# Patient Record
Sex: FEMALE | URBAN - METROPOLITAN AREA
[De-identification: names, ages, dates, MRNs, and addresses within clinical notes are randomized per-mention and may not be internally consistent; named-entity substitution may affect disease eponyms.]

---

## 2023-03-29 ENCOUNTER — OFFICE VISIT (OUTPATIENT)
Facility: LOCATION | Age: 73
End: 2023-03-29
Payer: MEDICARE

## 2023-03-29 DIAGNOSIS — H25.813 COMBINED FORMS OF AGE-RELATED CATARACT, BILATERAL: Primary | ICD-10-CM

## 2023-03-29 DIAGNOSIS — H04.123 DRY EYE SYNDROME OF BILATERAL LACRIMAL GLANDS: ICD-10-CM

## 2023-03-29 DIAGNOSIS — H02.413 MECHANICAL PTOSIS OF BILATERAL EYELIDS: ICD-10-CM

## 2023-03-29 DIAGNOSIS — H16.223 KERATOCONJUNCT SICCA, NOT SPECIFIED AS SJOGREN'S, BILATERAL: ICD-10-CM

## 2023-03-29 PROCEDURE — 92134 CPTRZ OPH DX IMG PST SGM RTA: CPT | Performed by: OPHTHALMOLOGY

## 2023-03-29 PROCEDURE — 99204 OFFICE O/P NEW MOD 45 MIN: CPT | Performed by: OPHTHALMOLOGY

## 2023-03-29 PROCEDURE — 92136 OPHTHALMIC BIOMETRY: CPT | Performed by: OPHTHALMOLOGY

## 2023-03-29 PROCEDURE — 92025 CPTRIZED CORNEAL TOPOGRAPHY: CPT | Performed by: OPHTHALMOLOGY

## 2023-03-29 ASSESSMENT — VISUAL ACUITY
OS: 20/150
OD: 20/70

## 2023-03-29 ASSESSMENT — INTRAOCULAR PRESSURE
OD: 17
OS: 17

## 2023-03-29 NOTE — IMPRESSION/PLAN
Impression: Combined forms of age-related cataract, bilateral: H25.813. Plan: Discussed R/B/A of cataract surgery including risk of bleeding, infection, decreased BCVA, loss of eye. No guarantees, possible need  for further surgery. Patient expressed good understanding and wishes to proceed with CE/IOL OS then proceed with OD. Patient elects to have standard cataract surgery OU. Pt aware she will need glasses after her sx. Referred to retina specialist for cataract surgery clearance. Explained to pt less than full visual recovery due to possible macular pathology. Pt expresses understanding. Plan for complex cataract surgery + vision blue. RTC in 1 week for pre-op. Lipiview at pre-op. Plan: OS on 4/18/23 and OD on 4/25/23.

## 2023-03-29 NOTE — IMPRESSION/PLAN
Impression: Keratoconjunct sicca, not specified as Sjogren's, bilateral: J62.973. Plan: No plugs indicated. Lipiview at pre-op.

## 2023-03-29 NOTE — IMPRESSION/PLAN
Impression: Mechanical ptosis of bilateral eyelids: H02.413. Plan: No surgery at present until pt is bothered.

## 2023-03-29 NOTE — IMPRESSION/PLAN
Impression: Dry eye syndrome of bilateral lacrimal glands: H04.123. Plan: Start AT's QID OU and hot compresses BID OU.

## 2023-03-30 RX ORDER — PREDNISOLONE ACETATE 10 MG/ML
1 % SUSPENSION/ DROPS OPHTHALMIC
Qty: 15 | Refills: 3 | Status: ACTIVE
Start: 2023-03-30

## 2023-03-30 RX ORDER — BROMFENAC SODIUM 0.7 MG/ML
0.07 % SOLUTION/ DROPS OPHTHALMIC
Qty: 3 | Refills: 3 | Status: ACTIVE
Start: 2023-03-30

## 2023-03-30 RX ORDER — MOXIFLOXACIN 5 MG/ML
0.5 % SOLUTION/ DROPS OPHTHALMIC
Qty: 3 | Refills: 3 | Status: ACTIVE
Start: 2023-03-30

## 2023-04-19 ENCOUNTER — POST-OPERATIVE VISIT (OUTPATIENT)
Facility: LOCATION | Age: 73
End: 2023-04-19
Payer: MEDICARE

## 2023-04-19 DIAGNOSIS — H25.813 COMBINED FORMS OF AGE-RELATED CATARACT, BILATERAL: Primary | ICD-10-CM

## 2023-04-19 DIAGNOSIS — Z96.1 PRESENCE OF INTRAOCULAR LENS: ICD-10-CM

## 2023-04-19 DIAGNOSIS — H25.811 COMBINED FORMS OF AGE-RELATED CATARACT, RIGHT EYE: ICD-10-CM

## 2023-04-19 PROCEDURE — 99024 POSTOP FOLLOW-UP VISIT: CPT | Performed by: STUDENT IN AN ORGANIZED HEALTH CARE EDUCATION/TRAINING PROGRAM

## 2023-04-19 ASSESSMENT — INTRAOCULAR PRESSURE
OS: 14
OD: 8

## 2023-04-19 NOTE — IMPRESSION/PLAN
Impression: Combined forms of age-related cataract, right eye: H25.811. Plan: Discussed R/B/A of cataract surgery including risk of bleeding, infection, decreased BCVA, loss of eye. No guarantees, possible need  for further surgery. Patient expressed good understanding and wishes to proceed with CE/IOL OS then proceed with OD. Patient elects to have standard cataract surgery OU. Pt aware she will need glasses after her sx. Referred to retina specialist for cataract surgery clearance. Explained to pt less than full visual recovery due to possible macular pathology. Pt expresses understanding. Plan for complex cataract surgery + vision blue. Plan: OD on 4/25/23. Second eye consent forms reviewed and signed.

## 2023-04-19 NOTE — IMPRESSION/PLAN
Impression: Presence of intraocular lens: Z96.1. CC: Patient presents today for 1 day s/p CEIOL OD CEIOL OD (04/18/2023) Plan: Patient reminded of post-operative restrictions (do not rub eye, do not bend over, do not  more than 10-15 lbs, do not sleep on side of surgery, do not get water, dust or dirt in eye, wear plastic shield while sleeping). _________________________________________________ Continue ATs QID OU. Surgery medications:
Continue moxfloxacin QID Continue Prednisolone QID Continue Prolensa QD
 
RTC 1 week - check VA, Ks, MR, IOP and re-evaluation of operated eye. RTC PRN decrease VA, increase pain, redness, discharge, photophobia, flashes/floaters, or other vision problems.

## 2023-04-26 ENCOUNTER — POST-OPERATIVE VISIT (OUTPATIENT)
Facility: LOCATION | Age: 73
End: 2023-04-26
Payer: MEDICARE

## 2023-04-26 DIAGNOSIS — Z48.810 ENCOUNTER FOR SURGICAL AFTERCARE FOLLOWING SURGERY ON A SENSE ORGAN: Primary | ICD-10-CM

## 2023-04-26 DIAGNOSIS — H25.811 COMBINED FORMS OF AGE-RELATED CATARACT, RIGHT EYE: ICD-10-CM

## 2023-04-26 PROCEDURE — 99024 POSTOP FOLLOW-UP VISIT: CPT | Performed by: OPTOMETRIST

## 2023-04-26 RX ORDER — PREDNISOLONE ACETATE 10 MG/ML
1 % SUSPENSION/ DROPS OPHTHALMIC
Qty: 15 | Refills: 3 | Status: ACTIVE
Start: 2023-04-26

## 2023-04-26 RX ORDER — MOXIFLOXACIN 5 MG/ML
0.5 % SOLUTION/ DROPS OPHTHALMIC
Qty: 3 | Refills: 3 | Status: INACTIVE
Start: 2023-04-26 | End: 2023-04-26

## 2023-04-26 RX ORDER — BROMFENAC SODIUM 0.7 MG/ML
0.07 % SOLUTION/ DROPS OPHTHALMIC
Qty: 3 | Refills: 3 | Status: ACTIVE
Start: 2023-04-26

## 2023-04-26 ASSESSMENT — KERATOMETRY: OS: 45.69

## 2023-04-26 ASSESSMENT — VISUAL ACUITY: OS: 20/25

## 2023-04-26 ASSESSMENT — INTRAOCULAR PRESSURE: OS: 11

## 2023-04-26 NOTE — IMPRESSION/PLAN
Impression: Combined forms of age-related cataract, right eye: H25.811. Plan: Discussed R/B/A of cataract surgery including risk of bleeding, infection, decreased BCVA, loss of eye. No guarantees, possible need  for further surgery. Patient expressed good understanding and wishes to proceed with CE/IOL OD.

## 2023-04-26 NOTE — IMPRESSION/PLAN
Impression: S/P Cataract Extraction by phacoemulsification with IOL placement OS - 8 Days. Encounter for surgical aftercare following surgery on a sense organ  Z48.810. 
s/p CEIOL surgery doing well. PCIOL centered and clear. (-) Siedel sign Patient is pseudophakic Plan: Patient to discontinue antibiotic, decrease steroid to BID x 1 month, continue NSAID x 1 month. Schedule explained and given to patient. RTC 1 month - check VA, Ks, MR, trial frame distance and near for glasses rx (d/c suture first if present), IOP and re-evaluation of operated eye. All restrictions discontinued. Patient able to resume all normal activities RTC PRN decrease VA, increase pain, redness, discharge, photophobia, flashes/floaters, or other vision problems.

## 2023-05-02 ENCOUNTER — Encounter (OUTPATIENT)
Facility: LOCATION | Age: 73
End: 2023-05-02
Payer: MEDICARE

## 2023-05-02 ENCOUNTER — PROCEDURE (OUTPATIENT)
Facility: LOCATION | Age: 73
End: 2023-05-02
Payer: MEDICARE

## 2023-05-02 PROCEDURE — 66982 XCAPSL CTRC RMVL CPLX WO ECP: CPT | Performed by: OPHTHALMOLOGY

## 2023-05-03 ENCOUNTER — POST-OPERATIVE VISIT (OUTPATIENT)
Facility: LOCATION | Age: 73
End: 2023-05-03
Payer: MEDICARE

## 2023-05-03 DIAGNOSIS — Z96.1 PRESENCE OF INTRAOCULAR LENS: Primary | ICD-10-CM

## 2023-05-03 PROCEDURE — 99024 POSTOP FOLLOW-UP VISIT: CPT | Performed by: OPTOMETRIST

## 2023-05-03 RX ORDER — BROMFENAC SODIUM 0.7 MG/ML
0.07 % SOLUTION/ DROPS OPHTHALMIC
Qty: 3 | Refills: 3 | Status: ACTIVE
Start: 2023-05-03

## 2023-05-03 RX ORDER — PREDNISOLONE ACETATE 10 MG/ML
1 % SUSPENSION/ DROPS OPHTHALMIC
Qty: 15 | Refills: 3 | Status: ACTIVE
Start: 2023-05-03

## 2023-05-03 ASSESSMENT — INTRAOCULAR PRESSURE
OS: 10
OD: 10

## 2023-05-03 NOTE — IMPRESSION/PLAN
Impression: S/P CE/IOL Standard Monofocal Goal Low Myopia due to Astigmatism OD - 1 Day. Presence of intraocular lens  Z96.1. 
s/p CEIOL surgery doing well. PCIOL centered and clear. (-) Siedel sign Patient is pseudophakic 2+ edema
bscl with good m&c present Plan: Patient to continue antibiotic, steroid, and NSAID as instructed. RTC 1 week - d/c bscl OD prior to any testing. check VA, Ks, MR, IOP and re-evaluation of operated eye. Patient reminded of post-operative restrictions (do not rub eye, do not bend over, do not  more than 10-15 lbs, do not sleep on side of surgery, do not get water, dust or dirt in eye, wear plastic shield while sleeping). RTC PRN decrease VA, increase pain, redness, discharge, photophobia, flashes/floaters, or other vision problems.

## 2023-05-10 ENCOUNTER — POST-OPERATIVE VISIT (OUTPATIENT)
Facility: LOCATION | Age: 73
End: 2023-05-10
Payer: MEDICARE

## 2023-05-10 DIAGNOSIS — Z96.1 PRESENCE OF INTRAOCULAR LENS: Primary | ICD-10-CM

## 2023-05-10 PROCEDURE — 99024 POSTOP FOLLOW-UP VISIT: CPT | Performed by: OPTOMETRIST

## 2023-05-10 RX ORDER — PREDNISOLONE ACETATE 10 MG/ML
1 % SUSPENSION/ DROPS OPHTHALMIC
Qty: 15 | Refills: 3 | Status: ACTIVE
Start: 2023-05-10

## 2023-05-10 ASSESSMENT — INTRAOCULAR PRESSURE
OD: 13
OS: 12

## 2023-05-10 ASSESSMENT — VISUAL ACUITY
OS: 20/25
OD: 20/30

## 2023-05-10 NOTE — IMPRESSION/PLAN
Impression: S/P CE/IOL Standard Monofocal Goal Low Myopia due to Astigmatism OD - 8 Days. Presence of intraocular lens  Z96.1.
 s/p CEIOL surgery doing well. PCIOL centered and clear. (-) Siedel sign Patient is pseudophakic Plan: Patient to discontinue antibiotic, decrease steroid to BID x 1 month, continue NSAID x 1 month. Schedule explained and given to patient. RTC 1 month - check VA, Ks, MR, trial frame distance and near for glasses rx (d/c suture first if present), IOP and re-evaluation of operated eye. All restrictions discontinued. Patient able to resume all normal activities. RTC PRN decrease VA, increase pain, redness, discharge, photophobia, flashes/floaters, or other vision problems.

## 2023-06-14 ENCOUNTER — POST-OPERATIVE VISIT (OUTPATIENT)
Facility: LOCATION | Age: 73
End: 2023-06-14
Payer: MEDICARE

## 2023-06-14 DIAGNOSIS — Z96.1 PRESENCE OF INTRAOCULAR LENS: Primary | ICD-10-CM

## 2023-06-14 PROCEDURE — 99024 POSTOP FOLLOW-UP VISIT: CPT | Performed by: OPTOMETRIST

## 2023-06-14 ASSESSMENT — KERATOMETRY
OD: 45.13
OS: 45.25

## 2023-06-14 ASSESSMENT — VISUAL ACUITY
OS: 20/25
OD: 20/25

## 2023-06-14 ASSESSMENT — INTRAOCULAR PRESSURE
OS: 11
OD: 9

## 2023-06-14 NOTE — IMPRESSION/PLAN
Impression: S/P CE/IOL Standard Monofocal Goal Low Myopia due to Astigmatism OD - 43 Days. Presence of intraocular lens  Z96.1.
s/p CEIOL surgery doing well. PCIOL centered and clear. (-) Siedel sign Patient is pseudophakic Plan: Glasses Rx released. RTC 1 year for evaluation of pseudophakia. RTC PRN decrease VA, increase pain, redness, discharge, photophobia, flashes/floaters, or other vision problems.